# Patient Record
Sex: MALE | Race: WHITE | NOT HISPANIC OR LATINO | Employment: OTHER | URBAN - NONMETROPOLITAN AREA
[De-identification: names, ages, dates, MRNs, and addresses within clinical notes are randomized per-mention and may not be internally consistent; named-entity substitution may affect disease eponyms.]

---

## 2023-05-02 ENCOUNTER — CONSULT (OUTPATIENT)
Dept: GASTROENTEROLOGY | Facility: CLINIC | Age: 73
End: 2023-05-02

## 2023-05-02 ENCOUNTER — APPOINTMENT (OUTPATIENT)
Dept: LAB | Facility: HOSPITAL | Age: 73
End: 2023-05-02

## 2023-05-02 VITALS
BODY MASS INDEX: 32.91 KG/M2 | RESPIRATION RATE: 18 BRPM | HEIGHT: 72 IN | HEART RATE: 70 BPM | DIASTOLIC BLOOD PRESSURE: 66 MMHG | OXYGEN SATURATION: 97 % | TEMPERATURE: 97.6 F | WEIGHT: 243 LBS | SYSTOLIC BLOOD PRESSURE: 122 MMHG

## 2023-05-02 DIAGNOSIS — Z12.11 SCREENING FOR COLON CANCER: ICD-10-CM

## 2023-05-02 DIAGNOSIS — R93.2 ABNORMAL FINDING ON IMAGING OF LIVER: ICD-10-CM

## 2023-05-02 DIAGNOSIS — D64.9 NORMOCYTIC ANEMIA: ICD-10-CM

## 2023-05-02 DIAGNOSIS — K21.9 GASTROESOPHAGEAL REFLUX DISEASE WITHOUT ESOPHAGITIS: ICD-10-CM

## 2023-05-02 DIAGNOSIS — K59.00 CONSTIPATION, UNSPECIFIED CONSTIPATION TYPE: ICD-10-CM

## 2023-05-02 DIAGNOSIS — Z86.010 PERSONAL HISTORY OF COLONIC POLYPS: Primary | ICD-10-CM

## 2023-05-02 DIAGNOSIS — K55.1 MESENTERIC VASCULAR INSUFFICIENCY (HCC): ICD-10-CM

## 2023-05-02 LAB
ALBUMIN SERPL BCP-MCNC: 4.1 G/DL (ref 3.5–5)
ALP SERPL-CCNC: 99 U/L (ref 34–104)
ALT SERPL W P-5'-P-CCNC: 21 U/L (ref 7–52)
ANION GAP SERPL CALCULATED.3IONS-SCNC: 10 MMOL/L (ref 4–13)
AST SERPL W P-5'-P-CCNC: 13 U/L (ref 13–39)
BASOPHILS # BLD AUTO: 0.08 THOUSANDS/ΜL (ref 0–0.1)
BASOPHILS NFR BLD AUTO: 1 % (ref 0–1)
BILIRUB SERPL-MCNC: 0.54 MG/DL (ref 0.2–1)
BUN SERPL-MCNC: 26 MG/DL (ref 5–25)
CALCIUM SERPL-MCNC: 9.7 MG/DL (ref 8.4–10.2)
CHLORIDE SERPL-SCNC: 95 MMOL/L (ref 96–108)
CO2 SERPL-SCNC: 31 MMOL/L (ref 21–32)
CREAT SERPL-MCNC: 1.09 MG/DL (ref 0.6–1.3)
EOSINOPHIL # BLD AUTO: 0.37 THOUSAND/ΜL (ref 0–0.61)
EOSINOPHIL NFR BLD AUTO: 4 % (ref 0–6)
ERYTHROCYTE [DISTWIDTH] IN BLOOD BY AUTOMATED COUNT: 13.3 % (ref 11.6–15.1)
GFR SERPL CREATININE-BSD FRML MDRD: 66 ML/MIN/1.73SQ M
GLUCOSE SERPL-MCNC: 98 MG/DL (ref 65–140)
HCT VFR BLD AUTO: 42.4 % (ref 36.5–49.3)
HGB BLD-MCNC: 14.3 G/DL (ref 12–17)
IMM GRANULOCYTES # BLD AUTO: 0.03 THOUSAND/UL (ref 0–0.2)
IMM GRANULOCYTES NFR BLD AUTO: 0 % (ref 0–2)
INR PPP: 1.05 (ref 0.84–1.19)
LYMPHOCYTES # BLD AUTO: 3.05 THOUSANDS/ΜL (ref 0.6–4.47)
LYMPHOCYTES NFR BLD AUTO: 33 % (ref 14–44)
MCH RBC QN AUTO: 34 PG (ref 26.8–34.3)
MCHC RBC AUTO-ENTMCNC: 33.7 G/DL (ref 31.4–37.4)
MCV RBC AUTO: 101 FL (ref 82–98)
MONOCYTES # BLD AUTO: 0.68 THOUSAND/ΜL (ref 0.17–1.22)
MONOCYTES NFR BLD AUTO: 7 % (ref 4–12)
NEUTROPHILS # BLD AUTO: 5.03 THOUSANDS/ΜL (ref 1.85–7.62)
NEUTS SEG NFR BLD AUTO: 55 % (ref 43–75)
NRBC BLD AUTO-RTO: 0 /100 WBCS
PLATELET # BLD AUTO: 366 THOUSANDS/UL (ref 149–390)
PMV BLD AUTO: 9.4 FL (ref 8.9–12.7)
POTASSIUM SERPL-SCNC: 3.7 MMOL/L (ref 3.5–5.3)
PROT SERPL-MCNC: 7.6 G/DL (ref 6.4–8.4)
PROTHROMBIN TIME: 14 SECONDS (ref 11.6–14.5)
RBC # BLD AUTO: 4.21 MILLION/UL (ref 3.88–5.62)
SODIUM SERPL-SCNC: 136 MMOL/L (ref 135–147)
WBC # BLD AUTO: 9.24 THOUSAND/UL (ref 4.31–10.16)

## 2023-05-02 RX ORDER — MECLIZINE HCL 25MG 25 MG/1
25 TABLET, CHEWABLE ORAL
COMMUNITY
Start: 2022-11-08

## 2023-05-02 RX ORDER — FUROSEMIDE 40 MG/1
40 TABLET ORAL
COMMUNITY
Start: 2023-03-09

## 2023-05-02 RX ORDER — POLYETHYLENE GLYCOL 3350 17 G/17G
POWDER, FOR SOLUTION ORAL
Qty: 238 G | Refills: 0 | Status: SHIPPED | OUTPATIENT
Start: 2023-05-02

## 2023-05-02 RX ORDER — GABAPENTIN 100 MG/1
100 CAPSULE ORAL DAILY
COMMUNITY

## 2023-05-02 RX ORDER — FLUTICASONE PROPIONATE AND SALMETEROL 250; 50 UG/1; UG/1
1 POWDER RESPIRATORY (INHALATION) 2 TIMES DAILY
COMMUNITY

## 2023-05-02 RX ORDER — ASPIRIN 81 MG/1
81 TABLET, CHEWABLE ORAL DAILY
COMMUNITY

## 2023-05-02 RX ORDER — CHLORTHALIDONE 25 MG/1
0.5 TABLET ORAL EVERY MORNING
COMMUNITY
Start: 2023-01-19

## 2023-05-02 RX ORDER — LEVOTHYROXINE SODIUM 88 UG/1
88 TABLET ORAL
COMMUNITY
Start: 2022-12-26

## 2023-05-02 RX ORDER — POLYETHYLENE GLYCOL 3350 17 G/17G
17 POWDER, FOR SOLUTION ORAL DAILY
Qty: 765 G | Refills: 1 | Status: SHIPPED | OUTPATIENT
Start: 2023-05-02

## 2023-05-02 RX ORDER — CARVEDILOL 12.5 MG/1
12.5 TABLET ORAL 2 TIMES DAILY
COMMUNITY

## 2023-05-02 RX ORDER — ROPINIROLE 0.5 MG/1
0.5 TABLET, FILM COATED ORAL
COMMUNITY
Start: 2023-03-30

## 2023-05-02 RX ORDER — TIOTROPIUM BROMIDE 18 UG/1
1 CAPSULE ORAL; RESPIRATORY (INHALATION) DAILY
COMMUNITY

## 2023-05-02 RX ORDER — ALBUTEROL SULFATE 90 UG/1
AEROSOL, METERED RESPIRATORY (INHALATION)
COMMUNITY
Start: 2023-04-30

## 2023-05-02 RX ORDER — MECLIZINE HYDROCHLORIDE 25 MG/1
25 TABLET ORAL
COMMUNITY

## 2023-05-02 RX ORDER — CARVEDILOL 25 MG/1
TABLET ORAL
COMMUNITY
Start: 2023-04-18

## 2023-05-02 RX ORDER — LORATADINE 10 MG/1
10 CAPSULE, LIQUID FILLED ORAL DAILY
COMMUNITY

## 2023-05-02 RX ORDER — CARBOXYMETHYLCELLULOSE SODIUM 5 MG/ML
SOLUTION/ DROPS OPHTHALMIC
COMMUNITY
Start: 2023-03-31

## 2023-05-02 RX ORDER — INDOMETHACIN 25 MG/1
25 CAPSULE ORAL
COMMUNITY
Start: 2023-03-23

## 2023-05-02 RX ORDER — ATORVASTATIN CALCIUM 40 MG/1
TABLET, FILM COATED ORAL
COMMUNITY
Start: 2023-02-14

## 2023-05-02 RX ORDER — ATORVASTATIN CALCIUM 20 MG/1
20 TABLET, FILM COATED ORAL DAILY
COMMUNITY

## 2023-05-02 RX ORDER — GABAPENTIN 100 MG/1
100 CAPSULE ORAL
COMMUNITY
Start: 2023-04-13

## 2023-05-02 NOTE — H&P (VIEW-ONLY)
[facility] - Outpatient Consultation  Torie Maravilla 74 y o  male MRN: [id number]  Encounter: [id number]    ASSESSMENT AND PLAN:      1  Personal history of colonic polyps  2  Screening for colon cancer  3  Constipation, unspecified constipation type    Patient was referred by the [address] [place] [address] as they note he is due for colonoscopy for surveillance purposes  Patient shares he may have had polyps in the past, though history is not clear  We reviewed that we will proceed with a colonoscopy at this time, though we will request cardiopulmonary clearance given patient's comorbidities  He does have some straining and constipation, and we are going to restart him on daily MiraLAX  This can be increased to twice daily if he needs  He does not feel he can complete a GoLytely bowel preparation, thus we will plan for a 2-day MiraLAX Dulcolax Gatorade bowel prep  The patient was counseled on possible risks of endoscopic procedure to include but not limited to bleeding, infection, and perforation  The patient demonstrates understanding and is in agreement with proceeding with endoscopic evaluation as planned  - Colonoscopy; Future  - polyethylene glycol (GLYCOLAX) 17 GM/SCOOP powder; Take as directed according to bowel prep instructions  Dispense: 238 g; Refill: 0    4  Gastroesophageal reflux disease without esophagitis    Patient does have a history of intermittent GERD symptoms  He was also noted to have a new anemia on last labs documented in 11/2022  His complete history is unknown, though the [address] [place] [address] is requesting an EGD as well  For now, we reviewed continuing with diet and lifestyle modifications for GERD  Okay for use of H2B as needed  We will proceed with EGD at the same time as colonoscopy  Additional recs to follow  - EGD; Future    5  Normocytic anemia    Noted on previous labs found in chart during patient's last hospitalization in 10/2022    I have not been able to locate any more recent labs and his records are incomplete in the long tendon of the medical record given he receives majority of care at the [facility]  Other than scant BRBPR on the wipe after straining, he denies any overt large-volume GI bleeding  At this time, recommend we repeat blood work and check iron studies     - Iron Panel (Includes Ferritin, Iron Sat%, Iron, and TIBC); Future    6  Abnormal finding on imaging of liver    Noted on discharge summary from 10/2022, though unable to view the imaging reports  Comments were made regarding cirrhotic liver morphology  Patient denies any known history of liver disease and no EtOH intake  We will obtain blood work and a right upper quadrant ultrasound now  The serologic evaluation will be to evaluate for thrombocytopenia, coagulopathy, hypoalbuminemia, elevated transaminases including hyperbilirubinemia  Additional investigation may be required in the future  - CBC and differential; Future  - Comprehensive metabolic panel; Future  - Protime-INR; Future  - US right upper quadrant; Future    7  Mesenteric vascular insufficiency (HCC)    Noted, patient was hospitalized in 10/2022 with abdominal pain and diarrhea  A CTA of the abdomen demonstrated severe atheromatous plaques involving the origin of the celiac artery, SMA, renal artery, and aortic bifurcation  Mesenteric duplex commented on EARL stenosis >50%  Vascular surgery was consulted and they did not feel his acute symptoms were consistent with a chronic mesenteric ischemia/angina  Patient shares he believes he follow-up with vascular surgery in the outpatient setting, though I do not have the documentation  At time, he does not have symptoms consistent with mesenteric angina, he is tolerating p o , his weight is stable, he is not having any food fear or early satiety  He is not having any significant abdominal cramping or diarrhea    However, we reviewed that it is pertinent that he follow-up with vascular "surgery if he has not done so  For any new intractable abdominal pain he should return to the ER  He should be optimized from a cardiovascular standpoint  We will follow up after endoscopic evaluation  Of note, he shares hx of systemic mast cell disease  He shares he previously followed with dermatology  He denies requiring premedication prior to any procedures and no history of anaphylaxis  ______________________________________________________________________    HPI: Patient is a 74 y o  male who presents today for a consultation regarding colonoscopy consultation  Past medical history significant for CAD s/p PCI, COPD, diastolic CHF, hypothyroidism, systemic mast cell disease  Pt is new to clinic  Patient receives all of his care through the [place]  He shares that he is due for colonoscopy for surveillance purposes  He is uncertain as to the timing of his last colonoscopy, thinks it may have been 10 to 12 years ago  He believes he may have had colon polyps in the past but is uncertain  He shares that he deals with constipation chronically  He needs to strain intermittently  He shares that when he \"finally has a bowel movement\" will have a firm stool followed by some loose stool  He notes a scant amount of BRBPR on the wipe only after straining  No large-volume hematochezia or melena  No nocturnal BMs  He believes he tried MiraLAX in the past so is unsure if this was effective  Patient  shares he does have some heartburn and regurgitation depending on oral intake, though denies any nausea, emesis, early satiety, dysphagia or odynophagia  No food fear or abnormal weight loss in the past 6 months  He does have chronic issues with back pain and pain that radiates down his legs  He has noted pain radiating from the back around to his groin as well  He was hospitalized in 10/2022 for intractable abdominal pain, nausea, vomiting, and nonbloody diarrhea     At that time, he reportedly had " a CT scan which commented on dilated fluid-filled small bowel loops and subsequently underwent a CTA which demonstrated severe atheromatous plaques involving the origin of the celiac artery, SMA, renal artery, and aortic bifurcation  Duplex ultrasound of abdomen revealed EARL stenosis greater than 50%  He was evaluated by vascular surgery and they did not find his symptoms were consistent with chronic mesenteric angina  He was treated with antibiotics at this time and he improved  Discharge summary also noted that surgeon imaging commented on cirrhotic liver morphology, though images were not available at time of office visit  NSAIDs: aleve for back pain   Tobacco: none   Etoh: none     Pt denies anti-coagulant usage, does use supplemental O2 at night, 3L, no pacer/defibrillator  10/2022: Hb 11 2, HCT 33 9, platelets 196, BUN 4, creatinine 0 8    Review of Systems   Otherwise Per HPI    Historical Information   History reviewed  No pertinent past medical history  History reviewed  No pertinent surgical history  Social History   Social History     Substance and Sexual Activity   Alcohol Use None     Social History     Substance and Sexual Activity   Drug Use Not on file     Social History     Tobacco Use   Smoking Status Not on file   Smokeless Tobacco Not on file     History reviewed  No pertinent family history  Meds/Allergies       Current Outpatient Medications:   •  albuterol (PROVENTIL HFA,VENTOLIN HFA) 90 mcg/act inhaler  •  Albuterol Sulfate 108 (90 Base) MCG/ACT AEPB  •  aspirin 81 mg chewable tablet  •  atorvastatin (LIPITOR) 20 mg tablet  •  atorvastatin (LIPITOR) 40 mg tablet  •  Calcium Carbonate-Vitamin D 600-3  125 MG-MCG TABS  •  carboxymethylcellulose (REFRESH PLUS) 0 5 % SOLN  •  carvedilol (COREG) 12 5 mg tablet  •  carvedilol (COREG) 25 mg tablet  •  chlorthalidone 25 mg tablet  •  Cholecalciferol 50 MCG (2000 UT) TABS  •  cyanocobalamin (VITAMIN B-12) 1000 MCG tablet  • Fluticasone-Salmeterol (Advair) 250-50 mcg/dose inhaler  •  furosemide (LASIX) 40 mg tablet  •  gabapentin (NEURONTIN) 100 mg capsule  •  gabapentin (NEURONTIN) 100 mg capsule  •  indomethacin (INDOCIN) 25 mg capsule  •  levothyroxine 88 mcg tablet  •  Loratadine 10 MG CAPS  •  meclizine (ANTIVERT) 25 mg tablet  •  Meclizine HCl 25 MG CHEW  •  rOPINIRole (REQUIP) 0 5 mg tablet  •  tiotropium (Spiriva HandiHaler) 18 mcg inhalation capsule  •  tiotropium (SPIRIVA RESPIMAT) 2 5 MCG/ACT AERS inhaler    No Known Allergies    Objective     There were no vitals taken for this visit  There is no height or weight on file to calculate BMI  Physical Exam  Vitals and nursing note reviewed  Constitutional:       Appearance: Normal appearance  HENT:      Head: Normocephalic and atraumatic  Eyes:      General: No scleral icterus  Conjunctiva/sclera: Conjunctivae normal    Cardiovascular:      Rate and Rhythm: Normal rate  Pulmonary:      Effort: Pulmonary effort is normal  No respiratory distress  Abdominal:      General: Abdomen is protuberant  Bowel sounds are normal       Palpations: There is no mass  Tenderness: There is no abdominal tenderness  There is no guarding or rebound  Musculoskeletal:      Right lower leg: Edema present  Left lower leg: Edema present  Skin:     General: Skin is warm  Findings: Rash present  Comments: Blanching erythematous rash diffusely covering body   Neurological:      General: No focal deficit present  Mental Status: He is alert and oriented to person, place, and time  Psychiatric:         Mood and Affect: Mood normal          Behavior: Behavior normal         Lab Results:   No visits with results within 1 Day(s) from this visit  Latest known visit with results is:   No results found for any previous visit  Radiology Results:   No results found      Jeanie Gitelman, PA-C    **Please note:  Dictation voice to text software may have been used in the creation of this record  Occasional wrong word or “sound alike” substitutions may have occurred due to the inherent limitations of voice recognition software  Read the chart carefully and recognize, using context, where substitutions have occurred  **

## 2023-05-02 NOTE — PROGRESS NOTES
[facility] - Outpatient Consultation  Julio Langley 74 y o  male MRN: [id number]  Encounter: [id number]    ASSESSMENT AND PLAN:      1  Personal history of colonic polyps  2  Screening for colon cancer  3  Constipation, unspecified constipation type    Patient was referred by the [address] [place] [address] as they note he is due for colonoscopy for surveillance purposes  Patient shares he may have had polyps in the past, though history is not clear  We reviewed that we will proceed with a colonoscopy at this time, though we will request cardiopulmonary clearance given patient's comorbidities  He does have some straining and constipation, and we are going to restart him on daily MiraLAX  This can be increased to twice daily if he needs  He does not feel he can complete a GoLytely bowel preparation, thus we will plan for a 2-day MiraLAX Dulcolax Gatorade bowel prep  The patient was counseled on possible risks of endoscopic procedure to include but not limited to bleeding, infection, and perforation  The patient demonstrates understanding and is in agreement with proceeding with endoscopic evaluation as planned  - Colonoscopy; Future  - polyethylene glycol (GLYCOLAX) 17 GM/SCOOP powder; Take as directed according to bowel prep instructions  Dispense: 238 g; Refill: 0    4  Gastroesophageal reflux disease without esophagitis    Patient does have a history of intermittent GERD symptoms  He was also noted to have a new anemia on last labs documented in 11/2022  His complete history is unknown, though the [address] [place] [address] is requesting an EGD as well  For now, we reviewed continuing with diet and lifestyle modifications for GERD  Okay for use of H2B as needed  We will proceed with EGD at the same time as colonoscopy  Additional recs to follow  - EGD; Future    5  Normocytic anemia    Noted on previous labs found in chart during patient's last hospitalization in 10/2022    I have not been able to locate any more recent labs and his records are incomplete in the long tendon of the medical record given he receives majority of care at the [place]  Other than scant BRBPR on the wipe after straining, he denies any overt large-volume GI bleeding  At this time, recommend we repeat blood work and check iron studies     - Iron Panel (Includes Ferritin, Iron Sat%, Iron, and TIBC); Future    6  Abnormal finding on imaging of liver    Noted on discharge summary from 10/2022, though unable to view the imaging reports  Comments were made regarding cirrhotic liver morphology  Patient denies any known history of liver disease and no EtOH intake  We will obtain blood work and a right upper quadrant ultrasound now  The serologic evaluation will be to evaluate for thrombocytopenia, coagulopathy, hypoalbuminemia, elevated transaminases including hyperbilirubinemia  Additional investigation may be required in the future  - CBC and differential; Future  - Comprehensive metabolic panel; Future  - Protime-INR; Future  - US right upper quadrant; Future    7  Mesenteric vascular insufficiency (HCC)    Noted, patient was hospitalized in 10/2022 with abdominal pain and diarrhea  A CTA of the abdomen demonstrated severe atheromatous plaques involving the origin of the celiac artery, SMA, renal artery, and aortic bifurcation  Mesenteric duplex commented on EARL stenosis >50%  Vascular surgery was consulted and they did not feel his acute symptoms were consistent with a chronic mesenteric ischemia/angina  Patient shares he believes he follow-up with vascular surgery in the outpatient setting, though I do not have the documentation  At time, he does not have symptoms consistent with mesenteric angina, he is tolerating p o , his weight is stable, he is not having any food fear or early satiety  He is not having any significant abdominal cramping or diarrhea    However, we reviewed that it is pertinent that he follow-up with vascular "surgery if he has not done so  For any new intractable abdominal pain he should return to the ER  He should be optimized from a cardiovascular standpoint  We will follow up after endoscopic evaluation  Of note, he shares hx of systemic mast cell disease  He shares he previously followed with dermatology  He denies requiring premedication prior to any procedures and no history of anaphylaxis  ______________________________________________________________________    HPI: Patient is a 74 y o  male who presents today for a consultation regarding colonoscopy consultation  Past medical history significant for CAD s/p PCI, COPD, diastolic CHF, hypothyroidism, systemic mast cell disease  Pt is new to clinic  Patient receives all of his care through the [place]  He shares that he is due for colonoscopy for surveillance purposes  He is uncertain as to the timing of his last colonoscopy, thinks it may have been 10 to 12 years ago  He believes he may have had colon polyps in the past but is uncertain  He shares that he deals with constipation chronically  He needs to strain intermittently  He shares that when he \"finally has a bowel movement\" will have a firm stool followed by some loose stool  He notes a scant amount of BRBPR on the wipe only after straining  No large-volume hematochezia or melena  No nocturnal BMs  He believes he tried MiraLAX in the past so is unsure if this was effective  Patient  shares he does have some heartburn and regurgitation depending on oral intake, though denies any nausea, emesis, early satiety, dysphagia or odynophagia  No food fear or abnormal weight loss in the past 6 months  He does have chronic issues with back pain and pain that radiates down his legs  He has noted pain radiating from the back around to his groin as well  He was hospitalized in 10/2022 for intractable abdominal pain, nausea, vomiting, and nonbloody diarrhea     At that time, he reportedly had " a CT scan which commented on dilated fluid-filled small bowel loops and subsequently underwent a CTA which demonstrated severe atheromatous plaques involving the origin of the celiac artery, SMA, renal artery, and aortic bifurcation  Duplex ultrasound of abdomen revealed EARL stenosis greater than 50%  He was evaluated by vascular surgery and they did not find his symptoms were consistent with chronic mesenteric angina  He was treated with antibiotics at this time and he improved  Discharge summary also noted that surgeon imaging commented on cirrhotic liver morphology, though images were not available at time of office visit  NSAIDs: aleve for back pain   Tobacco: none   Etoh: none     Pt denies anti-coagulant usage, does use supplemental O2 at night, 3L, no pacer/defibrillator  10/2022: Hb 11 2, HCT 33 9, platelets 034, BUN 4, creatinine 0 8    Review of Systems   Otherwise Per HPI    Historical Information   History reviewed  No pertinent past medical history  History reviewed  No pertinent surgical history  Social History   Social History     Substance and Sexual Activity   Alcohol Use None     Social History     Substance and Sexual Activity   Drug Use Not on file     Social History     Tobacco Use   Smoking Status Not on file   Smokeless Tobacco Not on file     History reviewed  No pertinent family history  Meds/Allergies       Current Outpatient Medications:     albuterol (PROVENTIL HFA,VENTOLIN HFA) 90 mcg/act inhaler    Albuterol Sulfate 108 (90 Base) MCG/ACT AEPB    aspirin 81 mg chewable tablet    atorvastatin (LIPITOR) 20 mg tablet    atorvastatin (LIPITOR) 40 mg tablet    Calcium Carbonate-Vitamin D 600-3  125 MG-MCG TABS    carboxymethylcellulose (REFRESH PLUS) 0 5 % SOLN    carvedilol (COREG) 12 5 mg tablet    carvedilol (COREG) 25 mg tablet    chlorthalidone 25 mg tablet    Cholecalciferol 50 MCG (2000 UT) TABS    cyanocobalamin (VITAMIN B-12) 1000 MCG tablet   Fluticasone-Salmeterol (Advair) 250-50 mcg/dose inhaler    furosemide (LASIX) 40 mg tablet    gabapentin (NEURONTIN) 100 mg capsule    gabapentin (NEURONTIN) 100 mg capsule    indomethacin (INDOCIN) 25 mg capsule    levothyroxine 88 mcg tablet    Loratadine 10 MG CAPS    meclizine (ANTIVERT) 25 mg tablet    Meclizine HCl 25 MG CHEW    rOPINIRole (REQUIP) 0 5 mg tablet    tiotropium (Spiriva HandiHaler) 18 mcg inhalation capsule    tiotropium (SPIRIVA RESPIMAT) 2 5 MCG/ACT AERS inhaler    No Known Allergies    Objective     There were no vitals taken for this visit  There is no height or weight on file to calculate BMI  Physical Exam  Vitals and nursing note reviewed  Constitutional:       Appearance: Normal appearance  HENT:      Head: Normocephalic and atraumatic  Eyes:      General: No scleral icterus  Conjunctiva/sclera: Conjunctivae normal    Cardiovascular:      Rate and Rhythm: Normal rate  Pulmonary:      Effort: Pulmonary effort is normal  No respiratory distress  Abdominal:      General: Abdomen is protuberant  Bowel sounds are normal       Palpations: There is no mass  Tenderness: There is no abdominal tenderness  There is no guarding or rebound  Musculoskeletal:      Right lower leg: Edema present  Left lower leg: Edema present  Skin:     General: Skin is warm  Findings: Rash present  Comments: Blanching erythematous rash diffusely covering body   Neurological:      General: No focal deficit present  Mental Status: He is alert and oriented to person, place, and time  Psychiatric:         Mood and Affect: Mood normal          Behavior: Behavior normal         Lab Results:   No visits with results within 1 Day(s) from this visit  Latest known visit with results is:   No results found for any previous visit  Radiology Results:   No results found      Manjeet Waters PA-C    **Please note:  Dictation voice to text software may have been used in the creation of this record  Occasional wrong word or sound alike substitutions may have occurred due to the inherent limitations of voice recognition software  Read the chart carefully and recognize, using context, where substitutions have occurred  **

## 2023-05-03 LAB
FERRITIN SERPL-MCNC: 87 NG/ML (ref 8–388)
IRON SATN MFR SERPL: 28 % (ref 20–50)
IRON SERPL-MCNC: 76 UG/DL (ref 65–175)
TIBC SERPL-MCNC: 271 UG/DL (ref 250–450)

## 2023-05-05 ENCOUNTER — HOSPITAL ENCOUNTER (OUTPATIENT)
Dept: ULTRASOUND IMAGING | Facility: HOSPITAL | Age: 73
Discharge: HOME | End: 2023-05-05

## 2023-05-05 ENCOUNTER — TELEPHONE (OUTPATIENT)
Dept: GASTROENTEROLOGY | Facility: CLINIC | Age: 73
End: 2023-05-05

## 2023-05-05 DIAGNOSIS — R93.2 ABNORMAL FINDING ON IMAGING OF LIVER: ICD-10-CM

## 2023-05-09 DIAGNOSIS — Z86.010 PERSONAL HISTORY OF COLONIC POLYPS: Primary | ICD-10-CM

## 2023-05-09 RX ORDER — BISACODYL 5 MG/1
TABLET, DELAYED RELEASE ORAL
Qty: 2 TABLET | Refills: 0 | Status: SHIPPED | OUTPATIENT
Start: 2023-05-09

## 2023-05-09 RX ORDER — POLYETHYLENE GLYCOL 3350 17 G/17G
POWDER, FOR SOLUTION ORAL
Qty: 238 G | Refills: 0 | Status: SHIPPED | OUTPATIENT
Start: 2023-05-09

## 2023-05-15 ENCOUNTER — TELEPHONE (OUTPATIENT)
Dept: PULMONOLOGY | Facility: CLINIC | Age: 73
End: 2023-05-15

## 2023-05-15 NOTE — TELEPHONE ENCOUNTER
----- Message from Deborah Neal PA-C sent at 5/15/2023  7:38 AM EDT -----  Please inform patient that the liver is slightly enlarged on US though there is no evidence of scarring or cirrhosis  This should be monitored over time  If he has any questions please let us know

## 2023-05-24 ENCOUNTER — HOSPITAL ENCOUNTER (OUTPATIENT)
Dept: PERIOP | Facility: HOSPITAL | Age: 73
Setting detail: OUTPATIENT SURGERY
Discharge: HOME | End: 2023-05-24
Admitting: INTERNAL MEDICINE

## 2023-05-24 ENCOUNTER — ANESTHESIA EVENT (OUTPATIENT)
Dept: PERIOP | Facility: HOSPITAL | Age: 73
End: 2023-05-24

## 2023-05-24 ENCOUNTER — ANESTHESIA (OUTPATIENT)
Dept: PERIOP | Facility: HOSPITAL | Age: 73
End: 2023-05-24

## 2023-05-24 VITALS
BODY MASS INDEX: 32.91 KG/M2 | OXYGEN SATURATION: 94 % | WEIGHT: 243 LBS | HEART RATE: 70 BPM | RESPIRATION RATE: 18 BRPM | HEIGHT: 72 IN | SYSTOLIC BLOOD PRESSURE: 146 MMHG | DIASTOLIC BLOOD PRESSURE: 82 MMHG | TEMPERATURE: 98.9 F

## 2023-05-24 DIAGNOSIS — Z12.11 SCREENING FOR COLON CANCER: ICD-10-CM

## 2023-05-24 DIAGNOSIS — Z86.010 PERSONAL HISTORY OF COLONIC POLYPS: ICD-10-CM

## 2023-05-24 DIAGNOSIS — K21.9 GASTROESOPHAGEAL REFLUX DISEASE WITHOUT ESOPHAGITIS: ICD-10-CM

## 2023-05-24 DIAGNOSIS — D64.9 NORMOCYTIC ANEMIA: ICD-10-CM

## 2023-05-24 DIAGNOSIS — K25.7 CHRONIC GASTRIC ULCER WITHOUT HEMORRHAGE AND WITHOUT PERFORATION: Primary | ICD-10-CM

## 2023-05-24 PROBLEM — D47.09 MASTOCYTOSIS: Status: ACTIVE | Noted: 2023-05-24

## 2023-05-24 RX ORDER — OMEPRAZOLE 40 MG/1
40 CAPSULE, DELAYED RELEASE ORAL
Qty: 60 CAPSULE | Refills: 4 | Status: SHIPPED | OUTPATIENT
Start: 2023-05-24 | End: 2023-10-21

## 2023-05-24 RX ORDER — PROPOFOL 10 MG/ML
INJECTION, EMULSION INTRAVENOUS AS NEEDED
Status: DISCONTINUED | OUTPATIENT
Start: 2023-05-24 | End: 2023-05-24

## 2023-05-24 RX ORDER — PROPOFOL 10 MG/ML
INJECTION, EMULSION INTRAVENOUS CONTINUOUS PRN
Status: DISCONTINUED | OUTPATIENT
Start: 2023-05-24 | End: 2023-05-24

## 2023-05-24 RX ORDER — LIDOCAINE HYDROCHLORIDE 20 MG/ML
INJECTION, SOLUTION EPIDURAL; INFILTRATION; INTRACAUDAL; PERINEURAL AS NEEDED
Status: DISCONTINUED | OUTPATIENT
Start: 2023-05-24 | End: 2023-05-24

## 2023-05-24 RX ORDER — SODIUM CHLORIDE, SODIUM LACTATE, POTASSIUM CHLORIDE, CALCIUM CHLORIDE 600; 310; 30; 20 MG/100ML; MG/100ML; MG/100ML; MG/100ML
125 INJECTION, SOLUTION INTRAVENOUS CONTINUOUS
Status: DISCONTINUED | OUTPATIENT
Start: 2023-05-24 | End: 2023-05-28 | Stop reason: HOSPADM

## 2023-05-24 RX ORDER — ONDANSETRON 2 MG/ML
4 INJECTION INTRAMUSCULAR; INTRAVENOUS ONCE AS NEEDED
Status: DISCONTINUED | OUTPATIENT
Start: 2023-05-24 | End: 2023-05-28 | Stop reason: HOSPADM

## 2023-05-24 RX ORDER — ALBUTEROL SULFATE 2.5 MG/3ML
2.5 SOLUTION RESPIRATORY (INHALATION) ONCE AS NEEDED
Status: DISCONTINUED | OUTPATIENT
Start: 2023-05-24 | End: 2023-05-28 | Stop reason: HOSPADM

## 2023-05-24 RX ADMIN — PROPOFOL 20 MG: 10 INJECTION, EMULSION INTRAVENOUS at 10:39

## 2023-05-24 RX ADMIN — PROPOFOL 50 MG: 10 INJECTION, EMULSION INTRAVENOUS at 10:34

## 2023-05-24 RX ADMIN — PROPOFOL 20 MG: 10 INJECTION, EMULSION INTRAVENOUS at 10:36

## 2023-05-24 RX ADMIN — PROPOFOL 20 MG: 10 INJECTION, EMULSION INTRAVENOUS at 10:43

## 2023-05-24 RX ADMIN — TOPICAL ANESTHETIC 1 SPRAY: 200 SPRAY DENTAL; PERIODONTAL at 10:28

## 2023-05-24 RX ADMIN — PROPOFOL 120 MCG/KG/MIN: 10 INJECTION, EMULSION INTRAVENOUS at 10:43

## 2023-05-24 RX ADMIN — LIDOCAINE HYDROCHLORIDE 100 MG: 20 INJECTION, SOLUTION EPIDURAL; INFILTRATION; INTRACAUDAL; PERINEURAL at 10:34

## 2023-05-24 RX ADMIN — SODIUM CHLORIDE, SODIUM LACTATE, POTASSIUM CHLORIDE, AND CALCIUM CHLORIDE 125 ML/HR: .6; .31; .03; .02 INJECTION, SOLUTION INTRAVENOUS at 09:31

## 2023-05-24 NOTE — ANESTHESIA POSTPROCEDURE EVALUATION
Post-Op Assessment Note    CV Status:  Stable    Pain management: adequate     Mental Status:  Alert and awake   Hydration Status:  Euvolemic   PONV Controlled:  Controlled   Airway Patency:  Patent      Post Op Vitals Reviewed: Yes      Staff: CRNA         No notable events documented      BP   129/64   Temp      Pulse  82   Resp   18   SpO2   98%

## 2023-05-24 NOTE — ANESTHESIA PREPROCEDURE EVALUATION
Procedure:  COLONOSCOPY  EGD    Relevant Problems   No relevant active problems        Physical Exam    Airway    Mallampati score: II  TM Distance: >3 FB  Neck ROM: full     Dental   upper dentures and lower dentures,     Cardiovascular  Cardiovascular exam normal    Pulmonary  Pulmonary exam normal     Other Findings       Hyperlipidemia    Hypertension    Angina pectoris (HCC)    Myocardial infarction (HCC)    Sleep apnea    Shortness of breath    COPD (chronic obstructive pulmonary disease) (HCC)    Asthma    Pulmonary emphysema (HCC)    GERD (gastroesophageal reflux disease)    Irregular heart beat    Chronic pain disorder    CPAP (continuous positive airway pressure) dependence        Hyperlipidemia    Hypertension    Angina pectoris (HCC)    Myocardial infarction (HCC)    Sleep apnea    Shortness of breath    COPD (chronic obstructive pulmonary disease) (HCC)    Asthma    Pulmonary emphysema (HCC)    GERD (gastroesophageal reflux disease)    Irregular heart beat    Chronic pain disorder    CPAP (continuous positive airway pressure) dependence    CHF (congestive heart failure) (HCC)    Disease of thyroid gland      Home o2 qhs 3l   Anesthesia Plan  ASA Score- 4     Anesthesia Type- IV sedation with anesthesia with ASA Monitors  Additional Monitors:   Airway Plan:           Plan Factors-Exercise tolerance (METS): >4 METS  Chart reviewed  EKG reviewed  Imaging results reviewed  Existing labs reviewed  Patient summary reviewed  Induction- intravenous  Postoperative Plan-     Informed Consent- Anesthetic plan and risks discussed with patient  I personally reviewed this patient with the CRNA  Discussed and agreed on the Anesthesia Plan with the CRNA  Maurisio Giraldo

## 2023-06-01 NOTE — RESULT ENCOUNTER NOTE
Hi,    Can you please let the patient know that we did remove 1 small polyp in the stomach  I would like him to get a repeat endoscopy in 3 months since he still had an ulcer in the stomach  Can you place a recall? We also removed 1 small precancerous polyp from the colon  Overall based on his age he does not need to get any repeat colonoscopies  Thank you!

## 2023-06-06 ENCOUNTER — TELEPHONE (OUTPATIENT)
Dept: GASTROENTEROLOGY | Facility: CLINIC | Age: 73
End: 2023-06-06

## 2023-07-31 ENCOUNTER — TELEPHONE (OUTPATIENT)
Dept: GASTROENTEROLOGY | Facility: CLINIC | Age: 73
End: 2023-07-31

## 2023-07-31 NOTE — TELEPHONE ENCOUNTER
Pt reported that when he takes the omeprazole it burns his stomach. he is taking the omeprazole once every 2 or 3 days. He said if he eats before taking the medicine it burns a little less.   He loses his appetite at times as well    He reported he has been tracking his B/P since 7/11/2023 and last night was a low reading of 100/44 but this morning was 111/70    He would like to discuss his symptoms with a nurse

## 2023-07-31 NOTE — TELEPHONE ENCOUNTER
Spoke to patient to schedule EGD.   Scheduled date of EGD (as of today) 8/22  Physician performing: Dr. Kevin Alonzo  Location of procedure:  [facility]  Instructions given to patient: EGD - mailed  Clearances: n/a

## 2023-08-02 NOTE — TELEPHONE ENCOUNTER
I spoke with patient and reviewed that he was ordered the medication to treat the ulcer found on EGD. He states he was experiencing some burning pain since last EGD in May and I explained that is why he is on the omeprazole for healing. He confirmed he is taking it twice a day now and he has noted improvement recently. I emphasized he should continue taking as ordered, patient will comply.

## 2023-08-09 ENCOUNTER — TELEPHONE (OUTPATIENT)
Age: 73
End: 2023-08-09

## 2023-08-09 NOTE — TELEPHONE ENCOUNTER
Pt called in to request we ensure the EGD he is having done 8/22 is approved by the [place]. Pt wanted to make sure he would not incur a bill.

## 2023-08-21 ENCOUNTER — ANESTHESIA (OUTPATIENT)
Dept: ANESTHESIOLOGY | Facility: HOSPITAL | Age: 73
End: 2023-08-21

## 2023-08-21 ENCOUNTER — ANESTHESIA EVENT (OUTPATIENT)
Dept: ANESTHESIOLOGY | Facility: HOSPITAL | Age: 73
End: 2023-08-21

## 2023-08-21 NOTE — ANESTHESIA PREPROCEDURE EVALUATION
Procedure:  PRE-OP ONLY    Relevant Problems   Other   (+) Mastocytosis        Physical Exam    Airway    Mallampati score: II  TM Distance: >3 FB  Neck ROM: full     Dental   No notable dental hx     Cardiovascular  Cardiovascular exam normal    Pulmonary  Pulmonary exam normal     Other Findings       Hyperlipidemia    Hypertension    Angina pectoris (HCC)    Myocardial infarction (HCC)    Sleep apnea    Shortness of breath    COPD (chronic obstructive pulmonary disease) (HCC)    Asthma    Pulmonary emphysema (HCC)    GERD (gastroesophageal reflux disease)    Irregular heart beat    Chronic pain disorder    CPAP (continuous positive airway pressure) dependence    CHF (congestive heart failure) (HCC)    Disease of thyroid gland    Mastocytosis systemic mastocytosis - skin rash     Coronary stent 2011        Anesthesia Plan  ASA Score- 3     Anesthesia Type- IV sedation with anesthesia with ASA Monitors. Additional Monitors:   Airway Plan:           Plan Factors-Exercise tolerance (METS): >4 METS. Chart reviewed. EKG reviewed. Imaging results reviewed. Existing labs reviewed. Patient summary reviewed. Patient is not a current smoker. Patient not instructed to abstain from smoking on day of procedure. Patient did not smoke on day of surgery. Obstructive sleep apnea risk education given perioperatively. Induction-     Postoperative Plan-     Informed Consent- Anesthetic plan and risks discussed with patient. I personally reviewed this patient with the CRNA. Discussed and agreed on the Anesthesia Plan with the CRNA. Noel Potter

## 2023-08-22 ENCOUNTER — ANESTHESIA EVENT (OUTPATIENT)
Dept: PERIOP | Facility: HOSPITAL | Age: 73
End: 2023-08-22

## 2023-08-22 ENCOUNTER — HOSPITAL ENCOUNTER (OUTPATIENT)
Dept: PERIOP | Facility: HOSPITAL | Age: 73
Setting detail: OUTPATIENT SURGERY
Discharge: HOME | End: 2023-08-22
Attending: STUDENT IN AN ORGANIZED HEALTH CARE EDUCATION/TRAINING PROGRAM
Payer: COMMERCIAL

## 2023-08-22 ENCOUNTER — ANESTHESIA (OUTPATIENT)
Dept: PERIOP | Facility: HOSPITAL | Age: 73
End: 2023-08-22

## 2023-08-22 VITALS
DIASTOLIC BLOOD PRESSURE: 64 MMHG | SYSTOLIC BLOOD PRESSURE: 118 MMHG | TEMPERATURE: 97.9 F | HEIGHT: 72 IN | BODY MASS INDEX: 32.51 KG/M2 | OXYGEN SATURATION: 96 % | HEART RATE: 91 BPM | WEIGHT: 240 LBS | RESPIRATION RATE: 20 BRPM

## 2023-08-22 DIAGNOSIS — K21.9 GASTROESOPHAGEAL REFLUX DISEASE WITHOUT ESOPHAGITIS: ICD-10-CM

## 2023-08-22 DIAGNOSIS — K25.7 CHRONIC GASTRIC ULCER WITHOUT HEMORRHAGE AND WITHOUT PERFORATION: ICD-10-CM

## 2023-08-22 PROCEDURE — 88305 TISSUE EXAM BY PATHOLOGIST: CPT | Performed by: PATHOLOGY

## 2023-08-22 RX ORDER — ALBUTEROL SULFATE 2.5 MG/3ML
2.5 SOLUTION RESPIRATORY (INHALATION) ONCE AS NEEDED
Status: DISCONTINUED | OUTPATIENT
Start: 2023-08-22 | End: 2023-08-26 | Stop reason: HOSPADM

## 2023-08-22 RX ORDER — PROPOFOL 10 MG/ML
INJECTION, EMULSION INTRAVENOUS AS NEEDED
Status: DISCONTINUED | OUTPATIENT
Start: 2023-08-22 | End: 2023-08-22

## 2023-08-22 RX ORDER — PHENYLEPHRINE HCL IN 0.9% NACL 1 MG/10 ML
SYRINGE (ML) INTRAVENOUS AS NEEDED
Status: DISCONTINUED | OUTPATIENT
Start: 2023-08-22 | End: 2023-08-22

## 2023-08-22 RX ORDER — LIDOCAINE HYDROCHLORIDE 20 MG/ML
INJECTION, SOLUTION EPIDURAL; INFILTRATION; INTRACAUDAL; PERINEURAL AS NEEDED
Status: DISCONTINUED | OUTPATIENT
Start: 2023-08-22 | End: 2023-08-22

## 2023-08-22 RX ORDER — OMEPRAZOLE 40 MG/1
40 CAPSULE, DELAYED RELEASE ORAL DAILY
Qty: 90 CAPSULE | Refills: 1 | Status: SHIPPED | OUTPATIENT
Start: 2023-08-22 | End: 2023-08-23 | Stop reason: SDUPTHER

## 2023-08-22 RX ORDER — ONDANSETRON 2 MG/ML
4 INJECTION INTRAMUSCULAR; INTRAVENOUS ONCE AS NEEDED
Status: DISCONTINUED | OUTPATIENT
Start: 2023-08-22 | End: 2023-08-26 | Stop reason: HOSPADM

## 2023-08-22 RX ORDER — SODIUM CHLORIDE, SODIUM LACTATE, POTASSIUM CHLORIDE, CALCIUM CHLORIDE 600; 310; 30; 20 MG/100ML; MG/100ML; MG/100ML; MG/100ML
125 INJECTION, SOLUTION INTRAVENOUS CONTINUOUS
Status: CANCELLED | OUTPATIENT
Start: 2023-08-22

## 2023-08-22 RX ORDER — ALBUTEROL SULFATE 2.5 MG/3ML
2.5 SOLUTION RESPIRATORY (INHALATION)
Status: DISCONTINUED | OUTPATIENT
Start: 2023-08-22 | End: 2023-08-26 | Stop reason: HOSPADM

## 2023-08-22 RX ORDER — SODIUM CHLORIDE, SODIUM LACTATE, POTASSIUM CHLORIDE, CALCIUM CHLORIDE 600; 310; 30; 20 MG/100ML; MG/100ML; MG/100ML; MG/100ML
INJECTION, SOLUTION INTRAVENOUS CONTINUOUS PRN
Status: DISCONTINUED | OUTPATIENT
Start: 2023-08-22 | End: 2023-08-22

## 2023-08-22 RX ADMIN — PROPOFOL 50 MG: 10 INJECTION, EMULSION INTRAVENOUS at 08:31

## 2023-08-22 RX ADMIN — Medication 100 MCG: at 08:29

## 2023-08-22 RX ADMIN — SODIUM CHLORIDE, SODIUM LACTATE, POTASSIUM CHLORIDE, AND CALCIUM CHLORIDE: .6; .31; .03; .02 INJECTION, SOLUTION INTRAVENOUS at 07:45

## 2023-08-22 RX ADMIN — PROPOFOL 60 MG: 10 INJECTION, EMULSION INTRAVENOUS at 08:29

## 2023-08-22 RX ADMIN — LIDOCAINE HYDROCHLORIDE 100 MG: 20 INJECTION, SOLUTION EPIDURAL; INFILTRATION; INTRACAUDAL; PERINEURAL at 08:29

## 2023-08-22 NOTE — ANESTHESIA POSTPROCEDURE EVALUATION
Post-Op Assessment Note    CV Status:  Stable  Pain Score: 0    Pain management: adequate     Mental Status:  Alert   Hydration Status:  Stable   PONV Controlled:  None   Airway Patency:  Patent      Post Op Vitals Reviewed: Yes      Staff: Anesthesiologist         No notable events documented.     BP   119/59   Temp      Pulse  91   Resp      SpO2   97

## 2023-08-22 NOTE — ANESTHESIA POSTPROCEDURE EVALUATION
Post-Op Assessment Note    CV Status:  Stable    Pain management: adequate     Mental Status:  Sleepy   Hydration Status:  Euvolemic   PONV Controlled:  Controlled   Airway Patency:  Patent      Post Op Vitals Reviewed: Yes      Staff: CRNA         No notable events documented.     BP      Temp      Pulse     Resp      SpO2

## 2023-08-22 NOTE — ANESTHESIA PREPROCEDURE EVALUATION
Procedure:  EGD    Relevant Problems   Other   (+) Mastocytosis        Physical Exam    Airway    Mallampati score: II  TM Distance: >3 FB  Neck ROM: full     Dental   No notable dental hx     Cardiovascular  Cardiovascular exam normal    Pulmonary  Pulmonary exam normal     Other Findings       Hyperlipidemia    Hypertension    Angina pectoris (HCC)    Myocardial infarction (HCC)    Sleep apnea    Shortness of breath    COPD (chronic obstructive pulmonary disease) (HCC)    Asthma    Pulmonary emphysema (HCC)    GERD (gastroesophageal reflux disease)    Irregular heart beat    Chronic pain disorder    CPAP (continuous positive airway pressure) dependence    CHF (congestive heart failure) (HCC)    Disease of thyroid gland    Mastocytosis systemic mastocytosis - skin rash     Coronary stent 2011        Anesthesia Plan  ASA Score- 3     Anesthesia Type- IV sedation with anesthesia with ASA Monitors. Additional Monitors:   Airway Plan:           Plan Factors-Exercise tolerance (METS): >4 METS. Chart reviewed. EKG reviewed. Imaging results reviewed. Existing labs reviewed. Patient summary reviewed. Patient is not a current smoker. Patient not instructed to abstain from smoking on day of procedure. Patient did not smoke on day of surgery. Obstructive sleep apnea risk education given perioperatively. Induction-     Postoperative Plan-     Informed Consent- Anesthetic plan and risks discussed with patient. I personally reviewed this patient with the CRNA. Discussed and agreed on the Anesthesia Plan with the CRNA. Palmira Mg

## 2023-08-22 NOTE — H&P
[facility]  History & Physical     PATIENT INFO     Name: Kamar Chatman  YOB: 1950   Age: 74 y.o. Sex: male   MRN: [id number]     HISTORY OF PRESENT ILLNESS     Kamar Chatman is a 74y.o. year old male who presents for EGD for history of esophagitis, gastric ulcer. REVIEW OF SYSTEMS     Per the HPI, and otherwise unremarkable.     Historical Information   Past Medical History:   Diagnosis Date   • Angina pectoris ([address])    • Asthma    • CHF (congestive heart failure) ([facility])    • Chronic pain disorder    • COPD (chronic obstructive pulmonary disease) (HCC)    • CPAP (continuous positive airway pressure) dependence    • Disease of thyroid gland    • GERD (gastroesophageal reflux disease)    • Hyperlipidemia    • Hypertension    • Irregular heart beat    • Mastocytosis     systemic mastocytosis - skin rash   • Myocardial infarction ([address])    • Pulmonary emphysema ([facility])    • Shortness of breath    • Skin rash    • Sleep apnea      Past Surgical History:   Procedure Laterality Date   • BACK SURGERY     • CATARACT EXTRACTION Bilateral    • COLONOSCOPY     • CORONARY ANGIOPLASTY WITH STENT PLACEMENT     • FRACTURE SURGERY Left     ankle surgery   • ROTATOR CUFF REPAIR Left 2013   • SHOULDER ARTHROPLASTY Left 2013     Social History   Social History     Substance and Sexual Activity   Alcohol Use Not Currently     Social History     Substance and Sexual Activity   Drug Use Never     Social History     Tobacco Use   Smoking Status Former   • Types: Cigarettes   • Quit date: 2011   • Years since quittin.5   Smokeless Tobacco Never     Family History   Problem Relation Age of Onset   • Diabetes Brother         MEDICATIONS & ALLERGIES     Current Outpatient Medications   Medication Instructions   • albuterol (PROVENTIL HFA,VENTOLIN HFA) 90 mcg/act inhaler INHALE 2 PUFFS BY MOUTH FOUR TIMES A DAY AS NEEDED FOR BREATHING   • aspirin 81 mg, Oral, Daily   • atorvastatin (LIPITOR) 40 mg tablet TAKE 20MG (ONE-HALF TABLET) BY MOUTH AT BEDTIME FOR CHOLESTEROL   • bisacodyl (DULCOLAX) 5 mg EC tablet Take as directed according to bowel prep instructions   • Calcium Carbonate-Vitamin D 600-3. 125 MG-MCG TABS Oral   • carboxymethylcellulose (REFRESH PLUS) 0.5 % SOLN INSTILL 1 DROP BOTH EYES THREE TIMES A DAY AS NEEDED FOR DRY EYES   • carvedilol (COREG) 12.5 mg, Oral, 2 times daily   • chlorthalidone 25 mg tablet 0.5 tablets, Oral, Every morning   • Cholecalciferol 50 MCG (2000 UT) TABS TAKE ONE TABLET BY MOUTH EVERY MORNING (FOR LOW VITAMIN D)   • cyanocobalamin (VITAMIN B-12) 1,000 mcg, Oral, Daily   • Fluticasone-Salmeterol (Advair) 250-50 mcg/dose inhaler 1 puff, Inhalation, 2 times daily   • furosemide (LASIX) 40 mg   • gabapentin (NEURONTIN) 100 mg   • indomethacin (INDOCIN) 25 mg   • levothyroxine 88 mcg   • Loratadine 10 mg, Oral, Daily   • meclizine (ANTIVERT) 25 mg, Oral   • Meclizine HCl 25 mg   • omeprazole (PRILOSEC) 40 mg, Oral, 2 times daily before meals   • polyethylene glycol (GLYCOLAX) 17 GM/SCOOP powder Take as directed according to bowel prep instructions for gatorade miralax prep   • polyethylene glycol (GLYCOLAX) 17 g, Oral, Daily   • rOPINIRole (REQUIP) 0.5 mg   • tiotropium (Spiriva HandiHaler) 18 mcg inhalation capsule 1 capsule, Inhalation, Daily   • tiotropium (SPIRIVA RESPIMAT) 2.5 MCG/ACT AERS inhaler INHALE 2 INHALATIONS (5MCG) BY MOUTH DAILY FOR BREATHING     No Known Allergies     PHYSICAL EXAM      Objective   Blood pressure 136/62, pulse 91, temperature 98.7 °F (37.1 °C), temperature source Tympanic, resp. rate 20, height 6' (1.829 m), weight 109 kg (240 lb), SpO2 97 %. Body mass index is 32.55 kg/m².     General Appearance:   Alert, cooperative, no distress   Lungs:   Equal chest rise, respirations unlabored    Heart:   Regular rate and rhythm   Abdomen:   Soft, non-tender, non-distended; normal bowel sounds; no masses, no organomegaly    Extremities:   No edema       ASSESSMENT & PLAN     This is a 74y.o. year old male here for EGD, and he is stable and optimized for his procedure. Chelsea Phelps D.O. [address] [facility]  Division of Gastroenterology & Hepatology  Available on Sameera. [email or web address]    ** Please Note: This note is constructed using a voice recognition dictation system.  **

## 2023-08-23 DIAGNOSIS — K21.9 GASTROESOPHAGEAL REFLUX DISEASE WITHOUT ESOPHAGITIS: ICD-10-CM

## 2023-08-23 DIAGNOSIS — K25.7 CHRONIC GASTRIC ULCER WITHOUT HEMORRHAGE AND WITHOUT PERFORATION: ICD-10-CM

## 2023-08-23 RX ORDER — OMEPRAZOLE 40 MG/1
40 CAPSULE, DELAYED RELEASE ORAL DAILY
Qty: 90 CAPSULE | Refills: 1 | Status: SHIPPED | OUTPATIENT
Start: 2023-08-23 | End: 2024-02-19

## 2023-08-23 NOTE — TELEPHONE ENCOUNTER
Medication omeprazole 40mg    Quantity 90    Pharmacy  [id number] - [place], [place] - [address]    PCP/Speciality  Gastro    Enough for 3 days -No

## 2023-08-25 DIAGNOSIS — B37.81 CANDIDA ESOPHAGITIS (HCC): Primary | ICD-10-CM

## 2023-08-25 PROCEDURE — 88305 TISSUE EXAM BY PATHOLOGIST: CPT | Performed by: PATHOLOGY

## 2023-08-25 RX ORDER — FLUCONAZOLE 200 MG/1
TABLET ORAL
Qty: 15 TABLET | Refills: 0 | Status: SHIPPED | OUTPATIENT
Start: 2023-08-25 | End: 2023-09-08